# Patient Record
Sex: MALE | Race: WHITE
[De-identification: names, ages, dates, MRNs, and addresses within clinical notes are randomized per-mention and may not be internally consistent; named-entity substitution may affect disease eponyms.]

---

## 2017-07-26 ENCOUNTER — HOSPITAL ENCOUNTER (OUTPATIENT)
Dept: HOSPITAL 47 - EC | Age: 57
Setting detail: OBSERVATION
LOS: 1 days | Discharge: HOME | End: 2017-07-27
Payer: COMMERCIAL

## 2017-07-26 VITALS — BODY MASS INDEX: 33.2 KG/M2

## 2017-07-26 DIAGNOSIS — E78.5: ICD-10-CM

## 2017-07-26 DIAGNOSIS — R42: ICD-10-CM

## 2017-07-26 DIAGNOSIS — I44.7: ICD-10-CM

## 2017-07-26 DIAGNOSIS — Z82.49: ICD-10-CM

## 2017-07-26 DIAGNOSIS — R06.00: ICD-10-CM

## 2017-07-26 DIAGNOSIS — R06.02: ICD-10-CM

## 2017-07-26 DIAGNOSIS — M19.90: ICD-10-CM

## 2017-07-26 DIAGNOSIS — G47.30: ICD-10-CM

## 2017-07-26 DIAGNOSIS — Z79.899: ICD-10-CM

## 2017-07-26 DIAGNOSIS — R07.89: Primary | ICD-10-CM

## 2017-07-26 DIAGNOSIS — K21.9: ICD-10-CM

## 2017-07-26 DIAGNOSIS — Z87.891: ICD-10-CM

## 2017-07-26 DIAGNOSIS — Z90.49: ICD-10-CM

## 2017-07-26 LAB
ALP SERPL-CCNC: 102 U/L (ref 38–126)
ALT SERPL-CCNC: 54 U/L (ref 21–72)
ANION GAP SERPL CALC-SCNC: 13 MMOL/L
APTT BLD: 24.4 SEC (ref 22–30)
AST SERPL-CCNC: 37 U/L (ref 17–59)
BASOPHILS # BLD AUTO: 0.1 K/UL (ref 0–0.2)
BASOPHILS NFR BLD AUTO: 1 %
BUN SERPL-SCNC: 16 MG/DL (ref 9–20)
CALCIUM SPEC-MCNC: 9.5 MG/DL (ref 8.4–10.2)
CH: 31.8
CHCM: 37.5
CHLORIDE SERPL-SCNC: 107 MMOL/L (ref 98–107)
CK SERPL-CCNC: 122 U/L (ref 55–170)
CK SERPL-CCNC: 124 U/L (ref 55–170)
CK SERPL-CCNC: 183 U/L (ref 55–170)
CO2 SERPL-SCNC: 23 MMOL/L (ref 22–30)
EOSINOPHIL # BLD AUTO: 0.2 K/UL (ref 0–0.7)
EOSINOPHIL NFR BLD AUTO: 3 %
ERYTHROCYTE [DISTWIDTH] IN BLOOD BY AUTOMATED COUNT: 5.15 M/UL (ref 4.3–5.9)
ERYTHROCYTE [DISTWIDTH] IN BLOOD: 14.6 % (ref 11.5–15.5)
GLUCOSE SERPL-MCNC: 113 MG/DL (ref 74–99)
HCT VFR BLD AUTO: 43.8 % (ref 39–53)
HDW: 3.07
HGB BLD-MCNC: 15.9 GM/DL (ref 13–17.5)
INR PPP: 1 (ref ?–1.2)
LUC NFR BLD AUTO: 3 %
LYMPHOCYTES # SPEC AUTO: 2.1 K/UL (ref 1–4.8)
LYMPHOCYTES NFR SPEC AUTO: 29 %
MAGNESIUM SPEC-SCNC: 2.1 MG/DL (ref 1.6–2.3)
MCH RBC QN AUTO: 30.9 PG (ref 25–35)
MCHC RBC AUTO-ENTMCNC: 36.3 G/DL (ref 31–37)
MCV RBC AUTO: 85.1 FL (ref 80–100)
MONOCYTES # BLD AUTO: 0.4 K/UL (ref 0–1)
MONOCYTES NFR BLD AUTO: 6 %
NEUTROPHILS # BLD AUTO: 4.2 K/UL (ref 1.3–7.7)
NEUTROPHILS NFR BLD AUTO: 59 %
NON-AFRICAN AMERICAN GFR(MDRD): >60
PH BLDV: 7.59 [PH] (ref 7.31–7.41)
POTASSIUM SERPL-SCNC: 4.1 MMOL/L (ref 3.5–5.1)
PROT SERPL-MCNC: 7.4 G/DL (ref 6.3–8.2)
PT BLD: 10.1 SEC (ref 9–12)
SODIUM SERPL-SCNC: 143 MMOL/L (ref 137–145)
TROPONIN I SERPL-MCNC: 0.05 NG/ML (ref 0–0.03)
TROPONIN I SERPL-MCNC: <0.012 NG/ML (ref 0–0.03)
TROPONIN I SERPL-MCNC: <0.012 NG/ML (ref 0–0.03)
WBC # BLD AUTO: 0.19 10*3/UL
WBC # BLD AUTO: 7.1 K/UL (ref 3.8–10.6)
WBC (PEROX): 6.82

## 2017-07-26 PROCEDURE — 85610 PROTHROMBIN TIME: CPT

## 2017-07-26 PROCEDURE — 82553 CREATINE MB FRACTION: CPT

## 2017-07-26 PROCEDURE — 83690 ASSAY OF LIPASE: CPT

## 2017-07-26 PROCEDURE — 96376 TX/PRO/DX INJ SAME DRUG ADON: CPT

## 2017-07-26 PROCEDURE — 36415 COLL VENOUS BLD VENIPUNCTURE: CPT

## 2017-07-26 PROCEDURE — 82803 BLOOD GASES ANY COMBINATION: CPT

## 2017-07-26 PROCEDURE — 96366 THER/PROPH/DIAG IV INF ADDON: CPT

## 2017-07-26 PROCEDURE — 99285 EMERGENCY DEPT VISIT HI MDM: CPT

## 2017-07-26 PROCEDURE — 96368 THER/DIAG CONCURRENT INF: CPT

## 2017-07-26 PROCEDURE — 85025 COMPLETE CBC W/AUTO DIFF WBC: CPT

## 2017-07-26 PROCEDURE — 83735 ASSAY OF MAGNESIUM: CPT

## 2017-07-26 PROCEDURE — 85379 FIBRIN DEGRADATION QUANT: CPT

## 2017-07-26 PROCEDURE — 96365 THER/PROPH/DIAG IV INF INIT: CPT

## 2017-07-26 PROCEDURE — 82550 ASSAY OF CK (CPK): CPT

## 2017-07-26 PROCEDURE — 93350 STRESS TTE ONLY: CPT

## 2017-07-26 PROCEDURE — 84484 ASSAY OF TROPONIN QUANT: CPT

## 2017-07-26 PROCEDURE — 78452 HT MUSCLE IMAGE SPECT MULT: CPT

## 2017-07-26 PROCEDURE — 93306 TTE W/DOPPLER COMPLETE: CPT

## 2017-07-26 PROCEDURE — 85730 THROMBOPLASTIN TIME PARTIAL: CPT

## 2017-07-26 PROCEDURE — 80053 COMPREHEN METABOLIC PANEL: CPT

## 2017-07-26 PROCEDURE — 93005 ELECTROCARDIOGRAM TRACING: CPT

## 2017-07-26 PROCEDURE — 93017 CV STRESS TEST TRACING ONLY: CPT

## 2017-07-26 PROCEDURE — 71020: CPT

## 2017-07-26 PROCEDURE — 80061 LIPID PANEL: CPT

## 2017-07-26 PROCEDURE — 74245: CPT

## 2017-07-26 PROCEDURE — 96375 TX/PRO/DX INJ NEW DRUG ADDON: CPT

## 2017-07-26 PROCEDURE — 83880 ASSAY OF NATRIURETIC PEPTIDE: CPT

## 2017-07-26 RX ADMIN — SUCRALFATE SCH: 1 TABLET ORAL at 19:19

## 2017-07-26 RX ADMIN — NITROGLYCERIN SCH INCH: 20 OINTMENT TOPICAL at 19:48

## 2017-07-26 RX ADMIN — SUCRALFATE SCH GM: 1 TABLET ORAL at 19:48

## 2017-07-26 RX ADMIN — POTASSIUM CHLORIDE SCH MLS/HR: 14.9 INJECTION, SOLUTION INTRAVENOUS at 19:48

## 2017-07-26 NOTE — P.HPIM
History of Present Illness


H&P Date: 17


Chief Complaint: Chest pain


This will serve both an H&P and discharge summary


This is a 56-year-old  male one of Dr. Carlson who is healthy with 

no prior medical history except for GERD who was seen in emergency room 

secondary to chest pressure and shortness of breath, patient was recognized as 

chest heaviness, nonradiating, started 30 minutes prior to arrival, patient was 

driving to work at that time, he describes as chest pressure, feeling of 

symptoms of bubble sitting in the lower sternal border, patient denies any 

history off chest pain on exertion, patient denies any exercise intolerance, 

patient takes 2-3 tablets up to 6 tablets per week off Aleve.  patient denies 

any nausea vomiting patient has some lightheadedness, risk factors include 

previous tobacco exposure, and age and family history of CAD on a sister and 

father in his 50s, hyperlipidemia.  He underwent a heart catheterization in 

 by Dr. Jack. Was found to have spastic arteries without any evidence of 

plaque formation, patient does not require any nitroglycerin outpatient patient 

currently is undergoing further imaging to include a stress test with 

consultation with cardiology during this admission.  Patient stayed on heparin 

drip and nitro drip, 3 sublingual nitroglycerin given in the ER did not provide 

complete relief, nitro drip subsequently has improved some of the symptoms 

however chest pressure still remains





 





Review of Systems


Constitutional: Reports as per HPI, Denies anorexia, Denies chills, Denies 

chronic headaches, Denies chronic pain, Denies daytime sleepiness, Denies 

fatigue, Denies fever, Denies lethargy, Denies malaise, Denies night sweats, 

Denies poor appetite, Denies sweats, Denies weakness, Denies weight gain, 

Denies weight loss


Ears, nose, mouth and throat: Reports as per HPI, Denies ant. neck pain, Denies 

bleeding gums, Denies dental pain, Denies dysphagia, Denies epistaxis, Denies 

headache, Denies hoarseness, Denies mouth pain, Denies nasal congestion, Denies 

nasal discharge, Denies neck fullness/pressure, Denies neck lump, Denies nose 

pain, Denies odynophagia, Denies post-nasal drip, Denies sinus pain, Denies 

sinus pressure, Denies swelling in mouth, Denies swelling in throat, Denies 

sore throat, Denies vertigo, Denies voice changes


Cardiovascular: Reports as per HPI, Reports chest pain, Denies claudication, 

Denies decreased exercise tolerance, Denies dyspnea on exertion, Denies edema, 

Denies high blood pressure, Denies irregular heart beat, Denies leg edema, 

Denies lightheadedness, Denies orthopnea, Denies palpitations, Denies 

paroxysmal nocturnal dyspnea, Denies phlebitis, Denies rapid heart beat, Denies 

shortness of breath, Denies syncope


Respiratory: Reports as per HPI, Denies congestion, Denies cough, Denies cough 

with sputum, Denies dyspnea, Denies excessive sputum, Denies hemoptysis, Denies 

home oxygen, Denies pain, Denies pain on inspiration, Denies pleurisy, Denies 

respiratory infections, Denies sleep apnea, Denies snoring, Denies wheezing


Gastrointestinal: Reports as per HPI, Denies abdominal pain, Denies belching, 

Denies bloating, Denies BRBPR, Denies change in bowel habits, Denies coffee 

ground emesis, Denies constipation, Denies diarrhea, Denies dyspepsia, Denies 

early satiety, Denies excessive gas, Denies heartburn, Denies hematemesis, 

Denies hematochezia, Denies indigestion, Denies jaundice, Denies lactose 

intolerance, Denies loss of appetite, Denies melena, Denies nausea, Denies 

vomiting


Genitourinary: Reports as per HPI, Denies decreased libido, Denies difficulties 

fathering child, Denies discharge, Denies dysuria, Denies erectile dysfunction, 

Denies flank pain, Denies genital pain, Denies genital sores, Denies hematuria, 

Denies impotence, Denies incontinence, Denies kidney stones, Denies nocturia, 

Denies polyuria, Denies testicular lump, Denies testicular pain, Denies urinary 

frequency, Denies urinary hesitancy, Denies urinary retention


Musculoskeletal: Reports as per HPI, Denies arm numbness/tingling, Denies 

atrophy, Denies fractures, Denies frequent falls, Denies gait dysfunction, 

Denies hot joints, Denies leg numbness/tingling, Denies limitation of motion, 

Denies loss of height, Denies low back pain, Denies morning stiffness, Denies 

muscle cramps, Denies muscle weakness, Denies myalgias, Denies neck pain, 

Denies neck stiffness, Denies prior amputations, Denies redness of joints, 

Denies shooting arm pain, Denies shooting leg pain


Integumentary: Reports as per HPI, Denies acne, Denies boils, Denies brittle 

nails, Denies change in hair/nails, Denies color changes, Denies darkening of 

skin, Denies depigmentation, Denies dryness, Denies foot/leg ulcers, Denies 

growths, Denies hirsutism, Denies lesions, Denies onychomycosis, Denies pruritus

, Denies rash, Denies sores, Denies striae, Denies unusual bruising, Denies 

wounds


Neurological: Reports as per HPI, Denies aphasia, Denies ataxia, Denies balance 

difficulties, Denies burning pain, Denies change in mentation, Denies change in 

smell/taste, Denies change in speech, Denies confusion, Denies convulsions, 

Denies double vision, Denies gait dysfunction, Denies head injury, Denies 

headaches, Denies hearing difficulties, Denies lack of coordination, Denies 

loss of vision, Denies memory loss, Denies migraines, Denies motor disturbance, 

Denies numbness, Denies paralysis, Denies paresthesias, Denies seizures, Denies 

sensory deficit, Denies spasticity, Denies syncope, Denies tic, Denies tingling

, Denies transient paralysis, Denies tremors, Denies vertigo, Denies weakness, 

Denies visual changes


Psychiatric: Reports as per HPI, Denies anhedonia, Denies anxiety, Denies 

anxiety attacks, Denies change in appetite, Denies change in libido, Denies 

change in sleep habits, Denies confusion, Denies depression, Denies difficulty 

concentrating, Denies disorientation, Denies hallucinations, Denies hopelessness

, Denies hypersomnia, Denies insomnia, Denies irritability, Denies memory loss, 

Denies mood swings, Denies paranoia, Denies sadness/tearfulness, Denies sleep 

disturbances, Denies suicidal ideation


Endocrine: Reports as per HPI, Denies cold intolerance, Denies deepening of the 

voice, Denies excessive sweating, Denies excessive thirst, Denies fatigue, 

Denies flushing, Denies heat intolerance, Denies high blood sugars, Denies 

increase in ring/shoe/hat size, Denies low blood sugars, Denies nocturia, 

Denies palpitations, Denies polydipsia, Denies polyphagia, Denies polyuria, 

Denies proptosis, Denies recent glucocorticoid use, Denies thyroid mass, Denies 

weight change


Hematologic/Lymphatic: Reports as per HPI, Denies easy bleeding, Denies easy 

bruising, Denies lymphadenopathy, Denies lymphedema, Denies thrombophilia





Past Medical History


Past Medical History: GERD/Reflux, Osteoarthritis (OA)


Additional Past Medical History / Comment(s): "spasms in arteries and small 

veins"


History of Any Multi-Drug Resistant Organisms: None Reported


Past Surgical History: Appendectomy, Cholecystectomy, Orthopedic Surgery


Additional Past Surgical History / Comment(s): R hip replacement, L shoulder 

surgery, bilateral lower extremities venous laser, negative cath .  

Colonoscopy Dr. Mayes 3 years ago, EGD Dr. Blackman in 2016


Past Anesthesia/Blood Transfusion Reactions: No Reported Reaction


Past Psychological History: No Psychological Hx Reported


Smoking Status: Former smoker


Past Alcohol Use History: None Reported


Past Drug Use History: None Reported





- Past Family History


  ** Mother


Family Medical History: Cancer (Mother  at age 95 from breast cancer she 

also had diabetes  and ovarian cancer), Diabetes Mellitus, Neurologic Disorder (

Dementia)





  ** Father


Family Medical History: Cancer (Father  at the age of 77 from non-Hodgkin 

lymphoma.)





  ** Sister(s)


Family Medical History: Coronary Artery Disease (CAD) (Patient had 4 sisters 

one of them  from CAD.)





  ** Son(s)


Family Medical History: No Reported History (Patient has a son with no major 

problems as well as one stepson)





  ** Daughter(s)


Family Medical History: No Reported History (Patient has one stepdaughter.)





Medications and Allergies


 Home Medications











 Medication  Instructions  Recorded  Confirmed  Type


 


Omeprazole [PriLOSEC] 40 mg PO BID 16 History


 


Albuterol Inhaler [Ventolin Hfa 2 puff INHALATION RT-Q4H PRN 17 

History





Inhaler]    


 


Diclofenac Sodium Gel [Voltaren 1 applic TOPICAL DAILY PRN 17 

History





Gel]    


 


Naproxen Sodium [Aleve] 440 mg PO Q12HR PRN 17 History


 


Plant Stanol Estefani [Cholest Off] 450 mg PO HS 17 History











 Allergies











Allergy/AdvReac Type Severity Reaction Status Date / Time


 


No Known Drug Allergies Allergy  Unknown Verified 17 07:25














Physical Exam


Vitals: 


 Vital Signs











  Temp Pulse Pulse Resp BP BP Pulse Ox


 


 17 12:58    54 L  18   116/67  99


 


 17 09:15  98.0 F   57 L  18   145/78  99


 


 17 08:57  98.2 F  58 L   18  121/73   96


 


 17 08:24   62   18  120/71   97


 


 17 08:14   64   18  110/64   97


 


 17 08:04   60   18  102/58   97


 


 17 07:49   56 L   18  124/74   97


 


 17 07:03   56 L   20  123/68   98


 


 17 06:57  97.6 F  61   22  124/72   99


 


 17 06:37   60   26 H  128/60   100


 


 17 06:33   55 L   20  131/73   99


 


 17 06:09   56 L   22  114/66   99


 


 17 05:50   62   22  143/83   100


 


 17 05:30  97.8 F  74   36 H  166/90   100








 Intake and Output











 17





 22:59 06:59 14:59


 


Intake Total   75


 


Balance   75


 


Intake:   


 


  IV   75


 


    Dextrose 5%-0.45% NaCl 1,   75





    000 ml @ 75 mls/hr IV .   





    [id number] [facility] Rx#:[id number]   


 


Other:   


 


  Weight  111.13 kg 














- Constitutional


General appearance: cooperative, no acute distress





- EENT


Eyes: anicteric sclerae, EOMI, PERRLA, dentition normal


ENT: NA/AT, normal oropharynx





- Neck


Neck: no lymphadenopathy, normal ROM, no other, no rigidity, no stridor, no 

thyromegaly





- Respiratory


Respiratory: bilateral: CTA, negative: diminished, dullness, rales, rhonchi





- Cardiovascular


Rhythm: regular


Heart sounds: normal: S1, S2


Abnormal Heart Sounds: no systolic murmur, no diastolic murmur, no rub, no S3 

Gallop, no S4 Gallop, no click, no other





- Gastrointestinal


General gastrointestinal: normal bowel sounds, soft





- Integumentary


Integumentary: normal, normal turgor





- Neurologic


Neurologic: CNII-XII intact





- Musculoskeletal


Musculoskeletal: no gait normal, no generalized weakness, no strength equal 

bilaterally, no right sided weakness, no left sided weakness





- Psychiatric


Psychiatric: A&O x's 3, appropriate affect, intact judgment & insight





Results


CBC & Chem 7: 


 17 05:41





 17 05:41


Labs: 


 Abnormal Lab Results - Last 24 Hours (Table)











  17 Range/Units





  05:41 05:41 05:41 


 


VBG pH    7.59 H  (7.31-7.41)  


 


VBG pCO2    19 L  (37-51)  mmHg


 


VBG HCO3    19 L  (24-28)  mmol/L


 


Glucose   113 H   (74-99)  mg/dL


 


Total Creatine Kinase  183 H    ()  U/L














Thrombosis Risk Factor Assmnt





- DVT/VTE Prophylaxis


DVT/VTE Prophylaxis: Low risk, early ambulation encouraged





- Choose All That Apply


Each Factor Represents 1 point: Age 41-60 years


Thrombosis Risk Factor Assessment Total Risk Factor Score: 1


Thrombosis Risk Factor Assessment Level: Low Risk





Assessment and Plan


Plan: 





1.  Atypical chest pressure with underlying history off vasospastic coronaries, 

who underwent previously a heart cath by Dr. Pearson in  with no obstructive 

disease, patient currently is being investigated with a nuclear stress test 

with consultation to cardiology, cardiac troponins are negative 3, normal d-

dimer 0.4





2.  GERD.  Continue patient on Prilosec 20 mg orally once every day, Carafate 1 

g every  before meals at bedtime, we will discontinue NSAIDs





3.  DVT prophylaxis.  Early ambulation with bilateral knee-high JORGE hose.





4.  GI prophylaxis.  On PPI





5.  Prior history of cholecystectomy secondary to cholecystitis no prior 

history of kidney stones





6.  Prior history off EGD approximately last  along with colonoscopy that 

shows minimal gastritis without any evidence of any peptic ulcer disease





7.  Probable hyperlipidemia patient's on plant stanol





  Discharge condition stable

## 2017-07-26 NOTE — P.CRDCN
History of Present Illness


Consult date: 17


Requesting physician: Anusha Eaton


Consult reason: chest pain


Chief complaint: Chest pressure and shortness of breath


History of present illness: 


This is a 56-year-old  gentleman with history of GERD, mild 

hyperlipidemia, sleep apnea, he does not use his CPAP.  family history of 

premature coronary artery disease who presents to the hospital with symptoms of 

chest pressure with associated shortness of breath.  He states that he feels 

heavy in the chest and is unable to take a deep breath.  Patient did undergo 

cardiac catheterization in  by Dr. Pearson and was not found to have any 

significant obstructive coronary artery disease but was told at that time to 

have a suspected spasm in the arteries.  Patient used to smoke heavily, and 

drank heavily, he quit that in .  Enzymes 2 have been negative.  EKG shows 

normal sinus rhythm with no changes.  Patient will be scheduled to undergo 

dobutamine echocardiographic study tomorrow further recommendations will be 

based on these findings and patient's clinical course.








Past Medical History


Past Medical History: GERD/Reflux, Osteoarthritis (OA)


Additional Past Medical History / Comment(s): "spasms in arteries and small 

veins"


History of Any Multi-Drug Resistant Organisms: None Reported


Past Surgical History: Appendectomy, Cholecystectomy, Orthopedic Surgery


Additional Past Surgical History / Comment(s): R hip replacement, L shoulder 

surgery, bilateral lower extremities venous laser, negative cath .  

Colonoscopy Dr. Mayes 3 years ago, EGD Dr. Blackman in 2016


Past Anesthesia/Blood Transfusion Reactions: No Reported Reaction


Past Psychological History: No Psychological Hx Reported


Smoking Status: Former smoker


Past Alcohol Use History: None Reported


Past Drug Use History: None Reported





- Past Family History


  ** Mother


Family Medical History: Cancer (Mother  at age 95 from breast cancer she 

also had diabetes  and ovarian cancer), Diabetes Mellitus, Neurologic Disorder (

Dementia)





  ** Father


Family Medical History: Cancer (Father  at the age of 77 from non-Hodgkin 

lymphoma.)





  ** Sister(s)


Family Medical History: Coronary Artery Disease (CAD) (Patient had 4 sisters 

one of them  from CAD.)





  ** Son(s)


Family Medical History: No Reported History (Patient has a son with no major 

problems as well as one stepson)





  ** Daughter(s)


Family Medical History: No Reported History (Patient has one stepdaughter.)





Medications and Allergies


 Home Medications











 Medication  Instructions  Recorded  Confirmed  Type


 


Omeprazole [PriLOSEC] 40 mg PO BID 16 History


 


Albuterol Inhaler [Ventolin Hfa 2 puff INHALATION RT-Q4H PRN 17 

History





Inhaler]    


 


Diclofenac Sodium Gel [Voltaren 1 applic TOPICAL DAILY PRN 17 

History





Gel]    


 


Naproxen Sodium [Aleve] 440 mg PO Q12HR PRN 17 History


 


Plant Stanol Estefani [Cholest Off] 450 mg PO HS 17 History











 Allergies











Allergy/AdvReac Type Severity Reaction Status Date / Time


 


No Known Drug Allergies Allergy  Unknown Verified 17 07:25














Physical Exam


Vitals: 


 Vital Signs











  Temp Pulse Pulse Resp BP BP Pulse Ox


 


 17 12:58    54 L  18   116/67  99


 


 17 09:15  98.0 F   57 L  18   145/78  99


 


 17 08:57  98.2 F  58 L   18  121/73   96


 


 17 08:24   62   18  120/71   97


 


 17 08:14   64   18  110/64   97


 


 17 08:04   60   18  102/58   97


 


 17 07:49   56 L   18  124/74   97


 


 17 07:03   56 L   20  123/68   98


 


 17 06:57  97.6 F  61   22  124/72   99


 


 17 06:37   60   26 H  128/60   100


 


 17 06:33   55 L   20  131/73   99


 


 17 06:09   56 L   22  114/66   99


 


 17 05:50   62   22  143/83   100


 


 17 05:30  97.8 F  74   36 H  166/90   100








 Intake and Output











 17





 06:59 14:59 22:59


 


Intake Total  75 


 


Balance  75 


 


Intake:   


 


  IV  75 


 


    Dextrose 5%-0.45% NaCl 1,  75 





    000 ml @ 75 mls/hr IV .   





    [id number] [facility] Rx#:[id number]   


 


Other:   


 


  Weight 111.13 kg  














PHYSICAL EXAMINATION: 





HEENT: Head is atraumatic, normocephalic.  Pupils equal, round.  Neck is 

supple.  There is no elevated jugular venous pressure.





HEART EXAMINATION: Heart S1, S2 normal.  No murmur or gallop heard.





CHEST EXAMINATION: Lungs are clear to auscultation and precussion. No chest 

wall tenderness is noted on palpation or with deep breathing.





ABDOMEN:  Soft, nontender. Bowel sounds are heard. No organomegaly noted].


 


EXTREMITIES:[ 2+ peripheral pulses with no evidence of peripheral edema and no 

calf tenderness noted].





NEUROLOGIC [patient is awake, alert and oriented -3.]


 


.


 








Results





 17 05:41





 17 05:41


 Cardiac Enzymes











  17 Range/Units





  05:41 05:41 12:41 


 


AST   37   (17-59)  U/L


 


CK-MB (CK-2)  0.8   0.6  (0.0-2.4)  ng/mL


 


Troponin I  <0.012   <0.012  (0.000-0.034)  ng/mL








 Coagulation











  17 Range/Units





  05:41 12:41 


 


PT  10.1   (9.0-12.0)  sec


 


APTT  24.4  29.1  (22.0-30.0)  sec








 CBC











  17 Range/Units





  05:41 


 


WBC  7.1  (3.8-10.6)  k/uL


 


RBC  5.15  (4.30-5.90)  m/uL


 


Hgb  15.9  (13.0-17.5)  gm/dL


 


Hct  43.8  (39.0-53.0)  %


 


Plt Count  214  (150-450)  k/uL








 Comprehensive Metabolic Panel











  17 Range/Units





  05:41 


 


Sodium  143  (137-145)  mmol/L


 


Potassium  4.1  (3.5-5.1)  mmol/L


 


Chloride  107  ()  mmol/L


 


Carbon Dioxide  23  (22-30)  mmol/L


 


BUN  16  (9-20)  mg/dL


 


Creatinine  0.77  (0.66-1.25)  mg/dL


 


Glucose  113 H  (74-99)  mg/dL


 


Calcium  9.5  (8.4-10.2)  mg/dL


 


AST  37  (17-59)  U/L


 


ALT  54  (21-72)  U/L


 


Alkaline Phosphatase  102  ()  U/L


 


Total Protein  7.4  (6.3-8.2)  g/dL


 


Albumin  4.5  (3.5-5.0)  g/dL








 Current Medications











Generic Name Dose Route Start Last Admin





  Trade Name Chavezq  PRN Reason Stop Dose Admin


 


Aminophylline  100 mg  17 15:34  





  Aminophylline  IV  17 23:59  





  ONCE PRN   





  Patient Response   


 


Dextrose/Sodium Chloride  1,000 mls @ 75 mls/hr  17 07:00  





  Dextrose 5%-1/2ns Iv Soln  IV   





  [id number] [facility]   


 


Morphine Sulfate  4 mg  17 06:48  





  Morphine Sulfate (Inj)  IV   





  Q4HR PRN   





  Severe Pain   


 


Naloxone HCl  0.2 mg  17 06:48  





  Narcan  IV   





  Q2M PRN   





  Opioid Reversal   


 


Ondansetron HCl  4 mg  17 06:48  





  Zofran  IVP   





  Q8HR PRN   





  Nausea And Vomiting   


 


Pantoprazole Sodium  40 mg  17 07:30  





  Protonix  PO   





  AC-BRKFST ANGELA   


 


Sucralfate  1 gm  17 17:30  





  Carafate  PO   





  ACHS ANGELA   








 Intake and Output











 17





 06:59 14:59 22:59


 


Intake Total  75 


 


Balance  75 


 


Intake:   


 


  IV  75 


 


    Dextrose 5%-0.45% NaCl 1,  75 





    000 ml @ 75 mls/hr IV .   





    [id number] [facility] Rx#:[id number]   


 


Other:   


 


  Weight 111.13 kg  








 





 17 05:41 





 17 05:41 











EKG Interpretations (text)


EKG shows normal sinus rhythm with incomplete left bundle branch block pattern, 

no acute changes noted.








Assessment and Plan


Plan: 


Assessment and plan


#1 chest pain, atypical for acute coronary syndrome.  Troponins negative 2.  

Cardiac catheterization performed in  revealed normal coronary arteries was 

suspicion of spasm


#2 history of smoking


#3 mild hyperlipidemia


#4 family history of premature coronary artery disease


# 5 GERD


#6 sleep apnea








Plan


We'll obtain an echocardiogram with Doppler study.  Patient has been 

recommended undergo dobutamine echocardiographic study, further recommendations 

will be based on these findings and the patient's clinical course.








DNP note has been reviewed, I agree with a documented findings and plan of 

care.  Patient was seen and examined.

## 2017-07-26 NOTE — ECHOF
Referral Reason:Chest pain



MEASUREMENTS

--------

HEIGHT: 182.9 cm

WEIGHT: 111.1 kg

BP: 124/72

RVIDd:   3.2 cm     (< 3.3)

IVSd:   1.1 cm     (0.6 - 1.1)

LVIDd:   5.2 cm     (3.9 - 5.3)

LVPWd:   1.6 cm     (0.6 - 1.1)

IVSs:   1.5 cm

LVIDs:   3.9 cm

LVPWs:   1.6 cm

LA Diam:   3.6 cm     (2.7 - 3.8)

LAESV Index (A-L):   23.30 ml/m

Ao Diam:   3.0 cm     (2.0 - 3.7)

AV Cusp:   2.4 cm     (1.5 - 2.6)

LA Diam:   4.1 cm     (2.7 - 3.8)

MV EXCURSION:   23.601 mm     (> 18.000)

MV EF SLOPE:   94 mm/s     (70 - 150)

EPSS:   0.5 cm

MV E Duane:   0.38 m/s

MV DecT:   240 ms

MV A Duane:   0.48 m/s

MV E/A Ratio:   0.80 

RAP:   5.00 mmHg

RVSP:   14.00 mmHg







FINDINGS

--------

Sinus rhythm.

This was a technically adequate study.

Left ventricular wall thickness is normal.   Overall 

left ventricular systolic function is normal with, an 

EF between 55 - 60 %.

The right ventricle is normal in size.

Normal LA  size by volume 22+/-6 ml/m2.

The right atrial size is normal.

There is mild aortic valve sclerosis.   There is no 

evidence of aortic regurgitation.

Mild mitral annular calcification present.   Mild 

mitral regurgitation is present.

Mild tricuspid regurgitation present.   There is no 

evidence of pulmonary hypertension.   The right 

ventricular systolic pressure, as measured by Doppler, 

is 14.00mmHg.

There is no pulmonic regurgitation present.

The aortic root size is normal.

There is no pericardial effusion.



CONCLUSIONS

--------

1. Left ventricular wall thickness is normal.

2. There is no pericardial effusion.

3. Overall left ventricular systolic function is normal 

with, an EF between 55 - 60 %.

4. There is mild aortic valve sclerosis.

5. Mild mitral regurgitation is present.

6. Mild tricuspid regurgitation present.

7. There is no evidence of pulmonary hypertension.

8. The right ventricular systolic pressure, as measured by 

Doppler, is 14.00mmHg.

9. There is no pulmonic regurgitation present.

10. The aortic root size is normal.





SONOGRAPHER: Cara Granado RDCS

## 2017-07-26 NOTE — XR
EXAM:

  XR Chest, 2 Views

 

CLINICAL HISTORY:

  Reason: Chest Pain

 

TECHNIQUE:

  Frontal and lateral views of the chest.

 

COMPARISON:

  Chest x-ray 12/22/16

 

FINDINGS:

  Lungs:  Hypoventilatory lungs.  Mild Bibasilar lung atelectasis.

  Pleural space:  Unremarkable.  No pneumothorax.

  Heart:  Unremarkable.  No cardiomegaly.

  Mediastinum:  Unremarkable.

  Bones/joints:  Unremarkable.

 

IMPRESSION:     

  Hypoventilatory lungs.  Mild Bibasilar lung atelectasis.

## 2017-07-26 NOTE — ED
General Adult HPI





- General


Chief complaint: Shortness of Breath


Stated complaint: MARCO A


Time Seen by Provider: 17 05:33


Source: patient, RN notes reviewed


Mode of arrival: wheelchair


Limitations: no limitations





- History of Present Illness


Initial comments: 





56 male presents with chief complaint of dyspnea and chest pressure.  Patient 

describes his chest pain is primarily pressure heaviness.  Some the center of 

his chest.  Nonradiating.  This pain started approximately 30 minutes prior to 

arrival.  He also had shortness of breath.  Denies nausea vomiting, states he 

did feel lightheaded.  Denies abdominal pain.  Patient has no known history of 

coronary artery disease, no history hypertension.  Patient was a former smoker.

  Patient states he had similar episode approximately to 3 years ago, patient 

states he did have a heart cath at that time and as he knows it was no 

intervention.  He believes his symptoms at that time were related to "spasms".  





- Related Data


 Home Medications











 Medication  Instructions  Recorded  Confirmed


 


Omeprazole [PriLOSEC] 40 mg PO DAILY 16











 Allergies











Allergy/AdvReac Type Severity Reaction Status Date / Time


 


No Known Drug Allergies Allergy  Unknown Verified 17 06:06














Review of Systems


ROS Statement: 


Those systems with pertinent positive or pertinent negative responses have been 

documented in the HPI.





ROS Other: All systems not noted in ROS Statement are negative.


Respiratory: Reports: dyspnea


Cardiovascular: Reports: chest pain





Past Medical History


Past Medical History: GERD/Reflux, Osteoarthritis (OA)


Additional Past Medical History / Comment(s): "spasms in arteries and small 

veins"


History of Any Multi-Drug Resistant Organisms: None Reported


Past Surgical History: Appendectomy, Cholecystectomy, Orthopedic Surgery


Additional Past Surgical History / Comment(s): R hip replacement, L shoulder 

surgery, bilateral lower extremities venous laser, negative cath 


Past Anesthesia/Blood Transfusion Reactions: No Reported Reaction


Past Psychological History: No Psychological Hx Reported


Smoking Status: Former smoker


Past Alcohol Use History: None Reported


Past Drug Use History: None Reported





- Past Family History


  ** Mother


Family Medical History: Cancer (Mother  at age 95 from breast cancer she 

also had diabetes [facility] Diabetes Mellitus





  ** Father


Family Medical History: Cancer (Father  at the age of 77 from non-Hodgkin 

lymphoma.)





  ** Sister(s)


Family Medical History: Coronary Artery Disease (CAD) (Patient had 4 sisters 

one of them  from CAD.)





  ** Son(s)


Family Medical History: No Reported History (Patient has a son with no major 

problems as well as one stepson)





  ** Daughter(s)


Family Medical History: No Reported History (Patient has one stepdaughter.)





General Exam


Limitations: no limitations


General appearance: alert, anxious, in distress


Head exam: Present: atraumatic, normocephalic


Eye exam: Present: normal appearance, PERRL


ENT exam: Present: normal exam, mucous membranes moist


Neck exam: Present: normal inspection, full ROM.  Absent: tenderness


Respiratory exam: Present: normal lung sounds bilaterally, respiratory distress

, other (Lungs are clear, good air entry, patient is tachypneic)


Cardiovascular Exam: Present: regular rate, normal rhythm


GI/Abdominal exam: Present: soft.  Absent: distended, tenderness, guarding


Extremities exam: Present: normal inspection, normal capillary refill, other (

Bilateral DP pulses 2+).  Absent: pedal edema


Neurological exam: Present: alert, oriented X3


Psychiatric exam: Present: anxious


Skin exam: Present: warm, dry.  Absent: cyanosis, diaphoretic





Course





 Vital Signs











  17





  05:30 05:50 06:09


 


Temperature 97.8 F  


 


Pulse Rate 74 62 56 L


 


Respiratory 36 H 22 22





Rate   


 


Blood Pressure 166/90 143/83 114/66


 


O2 Sat by Pulse 100 100 99





Oximetry   














  17





  06:33 06:37


 


Temperature  


 


Pulse Rate 55 L 60


 


Respiratory 20 26 H





Rate  


 


Blood Pressure 131/73 128/60


 


O2 Sat by Pulse 99 100





Oximetry  














EKG Findings





- EKG Comments:


EKG Findings:: EKG obtained at 534 with symptoms present shows normal sinus 

rhythm, incomplete left bundle.  There may be some ST segment depression in V5 

and V6.  Ventricular rate is 72, NV interval 206, QRS duration 116, .  

EKG obtained at 547 Patient is asymptomatic sinus rhythm with no ST segment 

elevation or depression.  EKG obtained at 628 unchanged, EKG at 637 unchanged.





Medical Decision Making





- Medical Decision Making





56-year-old male presenting with chest pressure and heaviness associated with 

shortness of breath.  Patient does appear uncomfortable on initial examination.

  Patient is given nitroglycerin and aspirin.  Pain does subside with 

nitroglycerin.  While in the emergency department the patient has waxing and 

waning symptoms.  Initial EKG does show some nonspecific changes concerning for 

ischemia.  Patient is started on heparin and nitroglycerin infusion.








Laboratory studies including CBC, CMP, and cardiac enzymes is unremarkable.  D-

dimer is obtained given the profound dyspnea.  This is negative.  Patient's 

shortness of breath is associated with his chest pain.








The patient states that approximately 3 years ago he had chest pain and 

believes that this was from spasms in his blood vessels, these records are not 

available at the time of my evaluation.





Patient will be admitted for unstable angina.





- Lab Data


Result diagrams: 


 17 05:41





 17 05:41





 Lab Results











  17 Range/Units





  05:41 05:41 05:41 


 


WBC   7.1   (3.8-10.6)  k/uL


 


RBC   5.15   (4.30-5.90)  m/uL


 


Hgb   15.9   (13.0-17.5)  gm/dL


 


Hct   43.8   (39.0-53.0)  %


 


MCV   85.1   (80.0-100.0)  fL


 


MCH   30.9   (25.0-35.0)  pg


 


MCHC   36.3   (31.0-37.0)  g/dL


 


RDW   14.6   (11.5-15.5)  %


 


Plt Count   214   (150-450)  k/uL


 


Neutrophils %   59   %


 


Lymphocytes %   29   %


 


Monocytes %   6   %


 


Eosinophils %   3   %


 


Basophils %   1   %


 


Neutrophils #   4.2   (1.3-7.7)  k/uL


 


Lymphocytes #   2.1   (1.0-4.8)  k/uL


 


Monocytes #   0.4   (0-1.0)  k/uL


 


Eosinophils #   0.2   (0-0.7)  k/uL


 


Basophils #   0.1   (0-0.2)  k/uL


 


Hyperchromasia   Slight   


 


PT     (9.0-12.0)  sec


 


INR     (<1.2)  


 


APTT     (22.0-30.0)  sec


 


D-Dimer     (<0.60)  mg/L FEU


 


VBG pH     (7.31-7.41)  


 


VBG pCO2     (37-51)  mmHg


 


VBG HCO3     (24-28)  mmol/L


 


Sodium    143  (137-145)  mmol/L


 


Potassium    4.1  (3.5-5.1)  mmol/L


 


Chloride    107  ()  mmol/L


 


Carbon Dioxide    23  (22-30)  mmol/L


 


Anion Gap    13  mmol/L


 


BUN    16  (9-20)  mg/dL


 


Creatinine    0.77  (0.66-1.25)  mg/dL


 


Est GFR (MDRD) Af Amer    >60  (>60 ml/min/1.73 sqM)  


 


Est GFR (MDRD) Non-Af    >60  (>60 ml/min/1.73 sqM)  


 


Glucose    113 H  (74-99)  mg/dL


 


Calcium    9.5  (8.4-10.2)  mg/dL


 


Magnesium    2.1  (1.6-2.3)  mg/dL


 


Total Bilirubin    0.8  (0.2-1.3)  mg/dL


 


AST    37  (17-59)  U/L


 


ALT    54  (21-72)  U/L


 


Alkaline Phosphatase    102  ()  U/L


 


Total Creatine Kinase  183 H    ()  U/L


 


CK-MB (CK-2)  0.8    (0.0-2.4)  ng/mL


 


CK-MB (CK-2) Rel Index  0.4    


 


Troponin I  <0.012    (0.000-0.034)  ng/mL


 


NT-Pro-B Natriuret Pep     pg/mL


 


Total Protein    7.4  (6.3-8.2)  g/dL


 


Albumin    4.5  (3.5-5.0)  g/dL














  17 Range/Units





  05:41 05:41 05:41 


 


WBC     (3.8-10.6)  k/uL


 


RBC     (4.30-5.90)  m/uL


 


Hgb     (13.0-17.5)  gm/dL


 


Hct     (39.0-53.0)  %


 


MCV     (80.0-100.0)  fL


 


MCH     (25.0-35.0)  pg


 


MCHC     (31.0-37.0)  g/dL


 


RDW     (11.5-15.5)  %


 


Plt Count     (150-450)  k/uL


 


Neutrophils %     %


 


Lymphocytes %     %


 


Monocytes %     %


 


Eosinophils %     %


 


Basophils %     %


 


Neutrophils #     (1.3-7.7)  k/uL


 


Lymphocytes #     (1.0-4.8)  k/uL


 


Monocytes #     (0-1.0)  k/uL


 


Eosinophils #     (0-0.7)  k/uL


 


Basophils #     (0-0.2)  k/uL


 


Hyperchromasia     


 


PT  10.1    (9.0-12.0)  sec


 


INR  1.0    (<1.2)  


 


APTT  24.4    (22.0-30.0)  sec


 


D-Dimer  0.40    (<0.60)  mg/L FEU


 


VBG pH    7.59 H  (7.31-7.41)  


 


VBG pCO2    19 L  (37-51)  mmHg


 


VBG HCO3    19 L  (24-28)  mmol/L


 


Sodium     (137-145)  mmol/L


 


Potassium     (3.5-5.1)  mmol/L


 


Chloride     ()  mmol/L


 


Carbon Dioxide     (22-30)  mmol/L


 


Anion Gap     mmol/L


 


BUN     (9-20)  mg/dL


 


Creatinine     (0.66-1.25)  mg/dL


 


Est GFR (MDRD) Af Amer     (>60 ml/min/1.73 sqM)  


 


Est GFR (MDRD) Non-Af     (>60 ml/min/1.73 sqM)  


 


Glucose     (74-99)  mg/dL


 


Calcium     (8.4-10.2)  mg/dL


 


Magnesium     (1.6-2.3)  mg/dL


 


Total Bilirubin     (0.2-1.3)  mg/dL


 


AST     (17-59)  U/L


 


ALT     (21-72)  U/L


 


Alkaline Phosphatase     ()  U/L


 


Total Creatine Kinase     ()  U/L


 


CK-MB (CK-2)     (0.0-2.4)  ng/mL


 


CK-MB (CK-2) Rel Index     


 


Troponin I     (0.000-0.034)  ng/mL


 


NT-Pro-B Natriuret Pep   30   pg/mL


 


Total Protein     (6.3-8.2)  g/dL


 


Albumin     (3.5-5.0)  g/dL














Disposition


Clinical Impression: 


 Unstable angina





Disposition: ADMITTED AS IP TO THIS [facility]


Referrals: 


Mara Carlson MD [Primary Care Provider] - 1-2 days


Decision to Admit Reason: Admit from EC


Decision Date: 17


Decision Time: 07:11

## 2017-07-27 VITALS
SYSTOLIC BLOOD PRESSURE: 134 MMHG | TEMPERATURE: 97.8 F | RESPIRATION RATE: 16 BRPM | HEART RATE: 60 BPM | DIASTOLIC BLOOD PRESSURE: 82 MMHG

## 2017-07-27 LAB
ALP SERPL-CCNC: 74 U/L (ref 38–126)
ALT SERPL-CCNC: 52 U/L (ref 21–72)
ANION GAP SERPL CALC-SCNC: 9 MMOL/L
AST SERPL-CCNC: 36 U/L (ref 17–59)
BASOPHILS # BLD AUTO: 0 K/UL (ref 0–0.2)
BASOPHILS NFR BLD AUTO: 1 %
BUN SERPL-SCNC: 13 MG/DL (ref 9–20)
CALCIUM SPEC-MCNC: 8.5 MG/DL (ref 8.4–10.2)
CH: 31.7
CHCM: 36.7
CHLORIDE SERPL-SCNC: 110 MMOL/L (ref 98–107)
CHOLEST SERPL-MCNC: 151 MG/DL (ref ?–200)
CK SERPL-CCNC: 109 U/L (ref 55–170)
CK SERPL-CCNC: 115 U/L (ref 55–170)
CO2 SERPL-SCNC: 23 MMOL/L (ref 22–30)
EOSINOPHIL # BLD AUTO: 0.2 K/UL (ref 0–0.7)
EOSINOPHIL NFR BLD AUTO: 4 %
ERYTHROCYTE [DISTWIDTH] IN BLOOD BY AUTOMATED COUNT: 4.8 M/UL (ref 4.3–5.9)
ERYTHROCYTE [DISTWIDTH] IN BLOOD: 14.6 % (ref 11.5–15.5)
GLUCOSE SERPL-MCNC: 92 MG/DL (ref 74–99)
HCT VFR BLD AUTO: 41.6 % (ref 39–53)
HDLC SERPL-MCNC: 27 MG/DL (ref 40–60)
HDW: 3
HGB BLD-MCNC: 14.6 GM/DL (ref 13–17.5)
INR PPP: 1 (ref ?–1.2)
LUC NFR BLD AUTO: 3 %
LYMPHOCYTES # SPEC AUTO: 1.5 K/UL (ref 1–4.8)
LYMPHOCYTES NFR SPEC AUTO: 27 %
MCH RBC QN AUTO: 30.5 PG (ref 25–35)
MCHC RBC AUTO-ENTMCNC: 35.2 G/DL (ref 31–37)
MCV RBC AUTO: 86.6 FL (ref 80–100)
MONOCYTES # BLD AUTO: 0.3 K/UL (ref 0–1)
MONOCYTES NFR BLD AUTO: 6 %
NEUTROPHILS # BLD AUTO: 3.2 K/UL (ref 1.3–7.7)
NEUTROPHILS NFR BLD AUTO: 60 %
NON-AFRICAN AMERICAN GFR(MDRD): >60
POTASSIUM SERPL-SCNC: 4.1 MMOL/L (ref 3.5–5.1)
PROT SERPL-MCNC: 6.3 G/DL (ref 6.3–8.2)
PT BLD: 10.5 SEC (ref 9–12)
SODIUM SERPL-SCNC: 142 MMOL/L (ref 137–145)
TROPONIN I SERPL-MCNC: 0.01 NG/ML (ref 0–0.03)
TROPONIN I SERPL-MCNC: <0.012 NG/ML (ref 0–0.03)
WBC # BLD AUTO: 0.14 10*3/UL
WBC # BLD AUTO: 5.4 K/UL (ref 3.8–10.6)
WBC (PEROX): 5.23

## 2017-07-27 RX ADMIN — NITROGLYCERIN SCH: 20 OINTMENT TOPICAL at 01:22

## 2017-07-27 RX ADMIN — POTASSIUM CHLORIDE SCH: 14.9 INJECTION, SOLUTION INTRAVENOUS at 11:11

## 2017-07-27 RX ADMIN — SUCRALFATE SCH: 1 TABLET ORAL at 11:10

## 2017-07-27 RX ADMIN — POTASSIUM CHLORIDE SCH: 14.9 INJECTION, SOLUTION INTRAVENOUS at 01:21

## 2017-07-27 RX ADMIN — NITROGLYCERIN SCH: 20 OINTMENT TOPICAL at 06:09

## 2017-07-27 RX ADMIN — NITROGLYCERIN SCH: 20 OINTMENT TOPICAL at 16:06

## 2017-07-27 RX ADMIN — SUCRALFATE SCH: 1 TABLET ORAL at 16:06

## 2017-07-27 NOTE — EST
Referral Reason:chest heaviness



MEASUREMENTS

--------

HEIGHT: 182.9 cm

WEIGHT: 111.1 kg

BP: 113/68







FINDINGS

--------

The patient received intravenous dobutamine in 5 min 

(low dose 5mcg/kg/min) and 3 minute stages 

(>5mcg/kg/min) to a maximum of 40mcg/kgm plus 0 mg 

atropine.

Max Heart Rate: 101  % of Max Predicted Heart Rate: 62  

Rest Heart Rate: 61  Rest BP: 113/85  Max BP: 168/36  

Mets Achieved: NA

The test was stopped because of shortness of breath.

Sinus rhythm.

At recovery dobutamine stress  there was appropriate 

augmentation of systolic function of all segments with 

decrease in cavity size.



CONCLUSIONS

--------

1. The patient received intravenous dobutamine in 5 min 

(low dose 5mcg/kg/min) and 3 minute stages 

(>5mcg/kg/min) to a maximum of 40mcg/kg/min plus 0 mg 

atropine.

2. The test was stopped because of shortness of breath.

3. Sinus rhythm.

4. At recovery dobutamine stress  there was appropriate 

augmentation of systolic function of all segments with 

decrease in cavity size.

5. Suboptimal study.  Target HR not achieved.





SONOGRAPHER: Toshia Kearns RDCS

[facility]

## 2017-07-27 NOTE — NM
EXAMINATION TYPE: NM stress lexiscan cardiolite

 

DATE OF EXAM: 7/27/2017

 

COMPARISON: Prior nuclear medicine stress Cardiolite exam March 24, 2014

 

HISTORY: Chest pain per order. History of Family history of heart attack, tobacco use quit 3 years ag
o, hypercholesteremia, and prior heart catheterization also presents with difficulty in breathing

 

TECHNIQUE:  After the intravenous administration of 10.58 mCi Tc 99m Sestamibi - Cardiolite resting S
PECT images acquired 45 minutes post injection. 

 

The patient received 0.4mg Lexiscan, 27.3 mCi Tc 99m Sestamibi - Stress images obtained 30 minutes po
st injection 

 

FINDINGS:  

 

Review of stress and rest SPECT images demonstrates no distinct perfusion abnormality.  Gated analysi
s shows normal wall motion with an estimated left ventricular ejection fraction of 62 %.

 

 

 

IMPRESSION:  

 

No scintigraphic evidence for reversible ischemia.

## 2017-07-27 NOTE — EST
Stress Test Results/Findings: 

Exam Performed: Stress Lexiscan

Exam Date: 7/27/17

Reason for Exam: Chest Pain



Height: 6 ft

Weight: 111.13 kg

Protocol: Lexiscan

Stage: 

Duration of Exercise: 



Resting Heart Rate: 56

Resting Blood Pressure: 129/71

Maximum Achieved Heart Rate: 94

Maximum Achieved Blood Pressure: 140/70

85% PMHR: 

100% PMHR: 

METS: 

Technologist Comment: 



Stress Test Results/Findings: 

Patient was given Lexiscan injection over a period of for 15 seconds.  A sting 
EKG shows normal sinus rhythm with normal TX interval and QRS duration and 
normal ST-T waves.  No ST segment depression suggestive of ischemia is noted.  
Results of the nuclear study will follow.

TRACIED

## 2017-07-27 NOTE — FL
EXAMINATION TYPE: FL UGI w esophagus w sm bowel

 

DATE OF EXAM: 7/27/2017

 

CLINICAL HISTORY: Upper abd discomfort, poss achalasia, esophageal s

 

 

TECHNIQUE: An air contrast UGI study is performed with small bowel follow through. Dedicated esophagr
am was also obtained.

 

FINDINGS:   image of the abdomen shows no gross abnormality.

 

The esophagus shows normal motility and emptying into the stomach.  No evidence of hiatal hernia or s
tricture noted. No aspiration or penetration.

 

The stomach shows normal distensibility, peristalsis, and mucosal folds.  No evidence of any mass or 
ulcer disease.  No significant esophageal reflux was seen during real time performance of this study.


 

The duodenal bulb and sweep are unremarkable.

 

The small bowel study shows normal transit to the colon in less than 110 minutes.  There is normal mu
cosal fold pattern throughout the small bowel.  There is no evidence of any stricture or filling defe
ct noted.  The terminal ileum is unremarkable.

 

IMPRESSION:  Normal upper GI study, esophagram and small bowel follow through.

## 2017-07-27 NOTE — ECHOS
Referral Reason:chest heaviness



MEASUREMENTS

--------

HEIGHT: 182.9 cm

WEIGHT: 111.1 kg

BP: 113/68







FINDINGS

--------

The patient received intravenous dobutamine in 5 min 

(low dose 5mcg/kg/min) and 3 minute stages 

(>5mcg/kg/min) to a maximum of 40mcg/kgm plus 0 mg 

atropine.

Max Heart Rate: 101  % of Max Predicted Heart Rate: 62  

Rest Heart Rate: 61  Rest BP: 113/85  Max BP: 168/36  

Mets Achieved: NA

The test was stopped because of shortness of breath.

Sinus rhythm.

At recovery dobutamine stress  there was appropriate 

augmentation of systolic function of all segments with 

decrease in cavity size.



CONCLUSIONS

--------

1. The patient received intravenous dobutamine in 5 min 

(low dose 5mcg/kg/min) and 3 minute stages 

(>5mcg/kg/min) to a maximum of 40mcg/kg/min plus 0 mg 

atropine.

2. The test was stopped because of shortness of breath.

3. Sinus rhythm.

4. At recovery dobutamine stress  there was appropriate 

augmentation of systolic function of all segments with 

decrease in cavity size.

5. Suboptimal study.  Target HR not achieved.





SONOGRAPHER: Toshia Kearns RDCS

## 2017-07-28 NOTE — P.PN
Progress Note - Text


This is a 56-year-old gentleman who was admitted to the hospital with 

complaints of exertional shortness of breath and atypical chest pain.  Patient 

was sent for dobutamine echo but patient developed significant shortness of 

breath and the test was canceled.  Subsequently, patient had a nuclear stress 

test with the Lexiscan.  This showed evidence of normal perfusion and function.

  Echocardiogram also showed normal function without any significant valvular 

abnormalities.  Patient is being discharged home.  Patient's symptoms are 

atypical and unexplained.  He is going to have follow-up with Dr. Holm and 

will have further evaluation.  Patient may need pulmonary function tests and 

sleep apnea study.





Physical examination revealed a middle-aged gentleman was alert, oriented and 

doesn't appear to be in acute distress.  Lungs are clear.  Heart is regular.  

No JVD.  No peripheral edema.  Vital signs are stable.





Final impression: Atypical chest pain and shortness of breath.





Plan: Patient is being discharged home.  Follow-up with Dr. Holm

## 2017-07-31 NOTE — P.STRESS
- Stress Test Note


Stress Test Results/Findings: 


Exam Performed: NM stress lexiscan cardiolite


Exam Date: 07/27/17


Reason for Exam: Chest Pain





Height: 6 ft


Weight: 111.13 kg


Protocol: Lexiscan


Stage: 


Duration of Exercise: 





Resting Heart Rate: 56


Resting Blood Pressure: 129/71


Maximum Achieved Heart Rate: 94


Maximum Achieved Blood Pressure: 140/70


85% PMHR: 


100% PMHR: 


METS: 


Technologist Comment: 





Stress Test Results/Findings: 


Patient was given Lexiscan injection what appeared of 15 seconds.  Resting EKG 

shows normal sinus rhythm with normal NE interval and QRS duration and normal ST

-T wave stool no ST segment depression suggestive of ischemia is noted.  

Results of the nuclear study will follow.

## 2017-08-18 ENCOUNTER — HOSPITAL ENCOUNTER (OUTPATIENT)
Dept: HOSPITAL 47 - RADCTMAIN | Age: 57
End: 2017-08-18
Payer: COMMERCIAL

## 2017-08-18 DIAGNOSIS — R10.11: ICD-10-CM

## 2017-08-18 DIAGNOSIS — N28.1: Primary | ICD-10-CM

## 2017-08-18 PROCEDURE — 74160 CT ABDOMEN W/CONTRAST: CPT

## 2017-08-18 NOTE — CT
EXAMINATION TYPE: CT abdomen w con

 

DATE OF EXAM: 8/18/2017

 

COMPARISON: 12/23/2016

 

INDICATION: Rt sided pain

 

DLP: 1325.7 mGycm, Automated exposure control for dose reduction was used.

 

CONTRAST:  100 mL of Omnipaque 300. 

                        Study performed with Oral Contrast

 

TECHNIQUE: Axial images were obtained from above the diaphragm to the pubic rami in the axial plane a
t 5 mm thick sections.  Reconstructed images are reviewed on the computer in the coronal plane.  

 

FINDINGS:

 

Limited CT sections are obtained the lung bases.  The lung bases are clear.

 

CT ABDOMEN:

 

Liver: Liver has diminished density in relation is clean compatible with mild to moderate fatty infil
tration of the liver. No discrete masses or cysts are evident.

 

Spleen: Normal

 

Pancreas: Normal

 

Adrenal glands: The adrenal glands are normal.

 

Gallbladder: Surgically absent  

 

Kidneys: No masses are evident. No hydronephrosis is present.   There is a 2.0 cm cyst measuring 4 Ho
unsfield units compatible with simple cyst on the mid posterior left kidney. This has enlarged from p
rior study.  Delayed images were obtained through the kidneys, which remain unremarkable.

 

Aorta: Normal

 

Inferior vena cava: Normal.

 

Loops of bowel within the abdomen and pelvis are normal.     There are loops of bowel which are incom
pletely distended or lack oral contrast limiting their evaluation.

 

Appendix: Not visualized. No suspicious tubular structure inflammatory changes are evident.

 

Osseous structures: No suspicious lytic or sclerotic lesions. Sacroiliac joint degenerative changes w
ithin the field-of-view is present.

 

IMPRESSIONS:

1.  2 cm simple appearing cyst posterior left kidney. This has enlarged from comparison.

## 2018-12-21 ENCOUNTER — HOSPITAL ENCOUNTER (OUTPATIENT)
Dept: HOSPITAL 47 - ORWHC2ENDO | Age: 58
Discharge: HOME | End: 2018-12-21
Attending: SURGERY
Payer: COMMERCIAL

## 2018-12-21 VITALS — SYSTOLIC BLOOD PRESSURE: 141 MMHG | HEART RATE: 59 BPM | DIASTOLIC BLOOD PRESSURE: 79 MMHG

## 2018-12-21 VITALS — BODY MASS INDEX: 34.5 KG/M2

## 2018-12-21 VITALS — TEMPERATURE: 98.3 F | RESPIRATION RATE: 18 BRPM

## 2018-12-21 DIAGNOSIS — Z79.899: ICD-10-CM

## 2018-12-21 DIAGNOSIS — K21.9: ICD-10-CM

## 2018-12-21 DIAGNOSIS — I25.2: ICD-10-CM

## 2018-12-21 DIAGNOSIS — Z87.891: ICD-10-CM

## 2018-12-21 DIAGNOSIS — K62.0: Primary | ICD-10-CM

## 2018-12-21 DIAGNOSIS — Z99.89: ICD-10-CM

## 2018-12-21 DIAGNOSIS — I20.1: ICD-10-CM

## 2018-12-21 DIAGNOSIS — M19.90: ICD-10-CM

## 2018-12-21 DIAGNOSIS — G47.30: ICD-10-CM

## 2018-12-21 DIAGNOSIS — G43.909: ICD-10-CM

## 2018-12-21 DIAGNOSIS — Z90.49: ICD-10-CM

## 2018-12-21 DIAGNOSIS — Z96.641: ICD-10-CM

## 2018-12-21 DIAGNOSIS — K64.4: ICD-10-CM

## 2018-12-21 DIAGNOSIS — K64.8: ICD-10-CM

## 2018-12-21 PROCEDURE — 45385 COLONOSCOPY W/LESION REMOVAL: CPT

## 2018-12-21 PROCEDURE — 88305 TISSUE EXAM BY PATHOLOGIST: CPT

## 2018-12-21 NOTE — P.PCN
Date of Procedure: 12/21/18


Procedure(s) Performed: 


Date of Procedure: 12/21/18


Procedure(s) Performed: 


PREOPERATIVE DIAGNOSIS: Rectal bleeding


POSTOPERATIVE DIAGNOSIS: Hypertrophied anal papilla


PROCEDURE: Colonoscopy with snare polypectomy


ANESTHESIA: MAC


SURGEON: Krathik Blackman M.D.


SPECIMENS: Anal papilla


ENDOSCOPIC PROCEDURE:  The patient was placed on the endoscopy table in the 

left decubitus position.  The Olympus colonoscope was inserted into the anus 

and passed under direct visualization to the base of the cecum.  The 

appendiceal orifice was visualized.  From that point the scope was slowly 

withdrawn inspecting all surfaces carefully.  There were no neoplastic 

inflammatory or polypoid lesions throughout the cecum, ascending, transverse, 

descending, sigmoid and rectum.  Retroflexion at the anus/distal rectum 

revealed a hypertrophied anal papilla.  This was removed using the snare with 

cautery technique.  There was no visible diverticulosis.  There were small 

internal and external hemorrhoids in the right posterior position and anterior 

position that appeared free of any recent bleeding.  The anal papilla itself 

however was indurated and was likely the source of recent bleeding.  The patient

's excessive mucousy drainage may have been on the basis of the papilla.  No 

fistula was identified.  Digital rectal examination was otherwise normal.  The 

patient was taken to the recovery room in stable condition per anesthesia 

guidelines.


RECOMMENDATIONS: Await biopsy results.  Monitor patient's symptoms.

## 2018-12-21 NOTE — P.GSHP
History of Present Illness


H&P Date: 18


Chief Complaint: Rectal bleeding





Patient here today for colonoscopy.  Patient has had frequent bloody and 

mucousy drainage rectally.  Some bad foul odor at times.  No fevers.  No pain.





Past Medical History


Past Medical History: Chest Pain / Angina, GERD/Reflux, Myocardial Infarction (

MI), Osteoarthritis (OA), Sleep Apnea/CPAP/BIPAP


Additional Past Medical History / Comment(s): "spasms in arteries and small 

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 

bleeding, bursitits,


Last Myocardial Infarction Date:: unknown


History of Any Multi-Drug Resistant Organisms: None Reported


Past Surgical History: Appendectomy, Cholecystectomy, Heart Catheterization, 

Orthopedic Surgery


Additional Past Surgical History / Comment(s): Rt hip replacement, L shoulder 

reconstructive surgery, bilateral lower extremities surgery for varicose veins-

laser,


Past Anesthesia/Blood Transfusion Reactions: No Reported Reaction


Smoking Status: Former smoker





- Past Family History


  ** Mother


Family Medical History: Cancer





  ** Father


Family Medical History: Cancer





  ** Sister(s)


Family Medical History: Coronary Artery Disease (CAD) (Patient had 4 sisters 

one of them  from CAD.)





  ** Son(s)


Family Medical History: No Reported History (Patient has a son with no major 

problems as well as one stepson)





  ** Daughter(s)


Family Medical History: No Reported History (Patient has one stepdaughter.)





Medications and Allergies


 Home Medications











 Medication  Instructions  Recorded  Confirmed  Type


 


Omeprazole [PriLOSEC] 40 mg PO QAM 16 History


 


ALPRAZolam [Xanax] 1 mg PO HS 18 History


 


Verapamil HCl [Verapamil ER] 120 mg PO QAM 18 History











 Allergies











Allergy/AdvReac Type Severity Reaction Status Date / Time


 


No Known Drug Allergies Allergy  Unknown Verified 18 16:39














Surgical - Exam


 Vital Signs











Temp Pulse Resp BP Pulse Ox


 


 98.3 F   75   18   140/75   97 


 


 18 09:43  18 09:43  18 09:43  18 09:43  18 09:43

















Physical exam:


General: Well-developed, well-nourished


HEENT: Normocephalic, sclerae nonicteric


Abdomen: Nontender, nondistended


Extremities: No edema


Neuro: Alert and oriented





Assessment and Plan


(1) Rectal bleeding


Narrative/Plan: 


Will proceed with colonoscopy with possible hemorrhoidal banding


Current Visit: Yes   Status: Acute   Code(s): K62.5 - HEMORRHAGE OF ANUS AND 

RECTUM   SNOMED Code(s): 18130753

## 2019-06-03 ENCOUNTER — HOSPITAL ENCOUNTER (OUTPATIENT)
Dept: HOSPITAL 47 - LABWHC1 | Age: 59
Discharge: HOME | End: 2019-06-03
Attending: SPECIALIST
Payer: COMMERCIAL

## 2019-06-03 DIAGNOSIS — M46.1: ICD-10-CM

## 2019-06-03 DIAGNOSIS — R91.8: ICD-10-CM

## 2019-06-03 DIAGNOSIS — M43.16: ICD-10-CM

## 2019-06-03 DIAGNOSIS — Z01.818: Primary | ICD-10-CM

## 2019-06-03 DIAGNOSIS — M54.5: ICD-10-CM

## 2019-06-03 DIAGNOSIS — M48.061: ICD-10-CM

## 2019-06-03 DIAGNOSIS — M99.83: ICD-10-CM

## 2019-06-03 DIAGNOSIS — G89.29: ICD-10-CM

## 2019-06-03 DIAGNOSIS — Z01.812: ICD-10-CM

## 2019-06-03 LAB
ANION GAP SERPL CALC-SCNC: 8 MMOL/L (ref 4–12)
APTT BLD: 25.8 SEC (ref 22–30)
BUN SERPL-SCNC: 13 MG/DL (ref 9–27)
CHLORIDE SERPL-SCNC: 107 MMOL/L (ref 96–109)
CO2 SERPL-SCNC: 25 MMOL/L (ref 21.6–31.8)
ERYTHROCYTE [DISTWIDTH] IN BLOOD BY AUTOMATED COUNT: 5.15 M/UL (ref 4.3–5.9)
ERYTHROCYTE [DISTWIDTH] IN BLOOD: 15.8 % (ref 11.5–15.5)
HCT VFR BLD AUTO: 43.4 % (ref 39–53)
HGB BLD-MCNC: 15 GM/DL (ref 13–17.5)
INR PPP: 0.9 (ref ?–1.2)
MCH RBC QN AUTO: 29.2 PG (ref 25–35)
MCHC RBC AUTO-ENTMCNC: 34.6 G/DL (ref 31–37)
MCV RBC AUTO: 84.3 FL (ref 80–100)
PH UR: 5 [PH] (ref 5–8)
PLATELET # BLD AUTO: 222 K/UL (ref 150–450)
POTASSIUM SERPL-SCNC: 4.1 MMOL/L (ref 3.5–5.5)
PT BLD: 10.1 SEC (ref 9–12)
SODIUM SERPL-SCNC: 140 MMOL/L (ref 135–145)
SP GR UR: 1.02 (ref 1–1.03)
UROBILINOGEN UR QL STRIP: <2 MG/DL (ref ?–2)
WBC # BLD AUTO: 7.5 K/UL (ref 3.8–10.6)

## 2019-06-03 PROCEDURE — 85610 PROTHROMBIN TIME: CPT

## 2019-06-03 PROCEDURE — 86900 BLOOD TYPING SEROLOGIC ABO: CPT

## 2019-06-03 PROCEDURE — 86901 BLOOD TYPING SEROLOGIC RH(D): CPT

## 2019-06-03 PROCEDURE — 84520 ASSAY OF UREA NITROGEN: CPT

## 2019-06-03 PROCEDURE — 86850 RBC ANTIBODY SCREEN: CPT

## 2019-06-03 PROCEDURE — 93005 ELECTROCARDIOGRAM TRACING: CPT

## 2019-06-03 PROCEDURE — 82565 ASSAY OF CREATININE: CPT

## 2019-06-03 PROCEDURE — 81003 URINALYSIS AUTO W/O SCOPE: CPT

## 2019-06-03 PROCEDURE — 85730 THROMBOPLASTIN TIME PARTIAL: CPT

## 2019-06-03 PROCEDURE — 71046 X-RAY EXAM CHEST 2 VIEWS: CPT

## 2019-06-03 PROCEDURE — 80051 ELECTROLYTE PANEL: CPT

## 2019-06-03 PROCEDURE — 87070 CULTURE OTHR SPECIMN AEROBIC: CPT

## 2019-06-03 PROCEDURE — 85027 COMPLETE CBC AUTOMATED: CPT

## 2019-06-03 PROCEDURE — 36415 COLL VENOUS BLD VENIPUNCTURE: CPT

## 2019-06-03 NOTE — XR
EXAMINATION TYPE: XR chest 2V

 

DATE OF EXAM: 6/3/2019

 

COMPARISON: NONE

 

HISTORY: Presurgical exam.

 

TECHNIQUE:  Frontal and lateral views of the chest are obtained.

 

FINDINGS:  Low lung volumes are seen throughout. There is no focal air space opacity, pleural effusio
n, or pneumothorax seen.  The cardiac silhouette size is within normal limits.   The osseous structur
es are intact. Mild multilevel degenerative changes of the thoracic spine are seen.

 

IMPRESSION:  Hypoventilatory lungs without acute cardiopulmonary process.

## 2019-09-27 ENCOUNTER — HOSPITAL ENCOUNTER (OUTPATIENT)
Dept: HOSPITAL 47 - ORWHC2ENDO | Age: 59
Discharge: HOME | End: 2019-09-27
Attending: INTERNAL MEDICINE
Payer: COMMERCIAL

## 2019-09-27 VITALS — HEART RATE: 69 BPM | DIASTOLIC BLOOD PRESSURE: 88 MMHG | SYSTOLIC BLOOD PRESSURE: 136 MMHG

## 2019-09-27 VITALS — RESPIRATION RATE: 16 BRPM | TEMPERATURE: 97.5 F

## 2019-09-27 VITALS — BODY MASS INDEX: 29.8 KG/M2

## 2019-09-27 DIAGNOSIS — Z90.49: ICD-10-CM

## 2019-09-27 DIAGNOSIS — I25.2: ICD-10-CM

## 2019-09-27 DIAGNOSIS — Z87.891: ICD-10-CM

## 2019-09-27 DIAGNOSIS — M19.90: ICD-10-CM

## 2019-09-27 DIAGNOSIS — Z87.81: ICD-10-CM

## 2019-09-27 DIAGNOSIS — Z79.899: ICD-10-CM

## 2019-09-27 DIAGNOSIS — K21.0: ICD-10-CM

## 2019-09-27 DIAGNOSIS — K29.50: Primary | ICD-10-CM

## 2019-09-27 DIAGNOSIS — J44.9: ICD-10-CM

## 2019-09-27 DIAGNOSIS — K31.9: ICD-10-CM

## 2019-09-27 PROCEDURE — 88305 TISSUE EXAM BY PATHOLOGIST: CPT

## 2019-09-27 PROCEDURE — 43239 EGD BIOPSY SINGLE/MULTIPLE: CPT

## 2019-09-27 NOTE — P.PCN
Date of Procedure: 09/27/19


Procedure(s) Performed: 


BRIEF HISTORY: Patient is a 58-year-old, pleasant, white male, scheduled for an 

upper endoscopy as a part of evaluation of long-standing history of GERD, 

atypical chest pain for the last 2 years duration.  He has been on Prilosec 20 

mg daily and is much better however continues to have intermittent spasms in the

mid sternal area associated with some dysphagia.. 





PROCEDURE PERFORMED: Esophagogastroduodenoscopy with biopsy.





PREOPERATIVE DIAGNOSIS: Long-standing history of GERD/atypical chest pain. 





IV sedation per anesthesia. 





PROCEDURE: After informed consent was obtained, the patient  was brought into 

the endoscopy unit. IV sedation was administered by Anesthesia under continuous 

monitoring. Initially the Olympus GIF-140 video endoscope was inserted into the 

mouth. Esophagus intubated without any difficulty. It was gradually advanced 

into the stomach and duodenum and carefully examined. The bulb and the second 

part of the duodenum appeared normal. The scope at this time was withdrawn to 

the stomach, adequately insufflated with air, and upon careful examination, mu

cosa of the antrum had mild gastritis and biopsies were done from this area.  

The, body, cardia and the fundus appeared normal. The scope was then withdrawn 

into the esophagus. The GE junction was located at 39 cm from the incisors. The 

esophagus appeared normal. There were no erosions or ulcerations seen , biopsies

were done from the distal esophagus and the patient tolerated the procedure 

well. 





IMPRESSION: 


1.  Mild antral gastritis.


2.  Normal-appearing esophagus with no evidence of esophagitis or esophageal 

stricture.





RECOMMENDATIONS: The findings of this examination were discussed with the 

patient well as his family.  He was advised to follow with the biopsy results.  

He will continue with Prevacid 20 mg daily and continue to follow antireflux 

measures.

## 2020-12-30 ENCOUNTER — HOSPITAL ENCOUNTER (OUTPATIENT)
Dept: HOSPITAL 47 - RADCTMAIN | Age: 60
Discharge: HOME | End: 2020-12-30
Attending: INTERNAL MEDICINE
Payer: COMMERCIAL

## 2020-12-30 DIAGNOSIS — Z88.6: ICD-10-CM

## 2020-12-30 DIAGNOSIS — R06.02: Primary | ICD-10-CM

## 2020-12-30 PROCEDURE — 71275 CT ANGIOGRAPHY CHEST: CPT

## 2020-12-30 NOTE — CT
EXAMINATION TYPE: CT angio chest

 

DATE OF EXAM: 12/30/2020

 

COMPARISON:

 

HISTORY: SOB, hx of COPD

 

CT DLP: 448.1 mGycm

Automated exposure control for dose reduction was used.

 

CONTRAST: 

CTA scan of the thorax is performed with IV Contrast, patient injected with 60cc mL of Isovue 370, pu
lmonary embolism protocol.  MIP images are created and reviewed.  3D reconstructed images are created
 on an independent workstation and reviewed.

 

FINDINGS:

 

LUNGS: The lungs are grossly clear, there is no concerning parenchymal mass or nodule identified.   T
here is no pleural effusion or pneumothorax seen. Mild prominence of the pulmonary artery, correlate 
for possible pulmonary artery hypertension The tracheobronchial tree is patent.

 

AORTA:  No additional significant abnormality is seen.

 

MEDIASTINUM: There is satisfactory enhancement of the pulmonary artery and its branches, there is no 
CT evidence for pulmonary embolism.  There are no greater than 1 cm hilar or mediastinal lymph nodes.
   No pericardial effusion is seen. 

 

 

OTHER:  Anterior flowing osteophytes in the thoracic spine with preservation of disc height may be in
dicative of diffuse idiopathic skeletal hyperostosis. Patient is post cholecystectomy.

 

 

 

IMPRESSION: 

NO EVIDENT PULMONARY EMBOLISM. CORRELATE FOR POSSIBLE PULMONARY ARTERY HYPERTENSION.

## 2021-01-13 ENCOUNTER — HOSPITAL ENCOUNTER (OUTPATIENT)
Dept: HOSPITAL 47 - RADECHMAIN | Age: 61
Discharge: HOME | End: 2021-01-13
Attending: INTERNAL MEDICINE
Payer: COMMERCIAL

## 2021-01-13 DIAGNOSIS — I08.1: Primary | ICD-10-CM

## 2021-01-13 PROCEDURE — 93306 TTE W/DOPPLER COMPLETE: CPT

## 2021-01-13 NOTE — ECHOF
Referral Reason:R06.09 Dyspnea



MEASUREMENTS

--------

HEIGHT: 182.9 cm

WEIGHT: 97.5 kg

BP: 

RVIDd:   3.0 cm     (< 3.3)

IVSd:   1.0 cm     (0.6 - 1.1)

LVIDd:   4.9 cm     (3.9 - 5.3)

LVPWd:   1.0 cm     (0.6 - 1.1)

IVSs:   1.4 cm

LVIDs:   3.6 cm

LVPWs:   1.7 cm

LAESV Index (A-L):   24.85 ml/m

Ao Diam:   3.1 cm     (2.0 - 3.7)

AV Cusp:   1.9 cm     (1.5 - 2.6)

MV EXCURSION:   18.395 mm     (> 18.000)

MV EF SLOPE:   69 mm/s     (70 - 150)

EPSS:   0.6 cm

MV E Duane:   0.54 m/s

MV DecT:   171 ms

MV A Duane:   0.58 m/s

MV E/A Ratio:   0.94 

RAP:   5.00 mmHg

RVSP:   13.09 mmHg







FINDINGS

--------

Sinus rhythm.

This was a technically good study.

LV size, wall thickness and systolic function are normal, with an EF greater than 55%.   The left eddie
tricular size is normal.

The right ventricle is normal in size.

Normal LA  size by volume 22+/-6 ml/m2.

The right atrial size is normal.

The aortic valve is trileaflet, and appears structurally normal. No aortic stenosis or regurgitation.


Mild mitral regurgitation is present.

Mild tricuspid regurgitation present.   Right ventricular systolic pressure is normal at < 35 mmHg.

There is no pulmonic regurgitation present.

The aortic root size is normal.

Echo free space represents a pericardial fat pad.



CONCLUSIONS

--------

1. LV size, wall thickness and systolic function are normal, with an EF greater than 55%.

2. The left ventricular size is normal.

3. The right ventricle is normal in size.

4. Normal LA size by volume 22+/-6 ml/m2.

5. The right atrial size is normal.

6. Mild mitral regurgitation is present.

7. Mild tricuspid regurgitation present.

8. The aortic root size is normal.

9. Echo free space represents a pericardial fat pad.





SONOGRAPHER: Cara Granado RDCS

## 2021-02-12 ENCOUNTER — HOSPITAL ENCOUNTER (OUTPATIENT)
Dept: HOSPITAL 47 - RADCTMAIN | Age: 61
Discharge: HOME | End: 2021-02-12
Attending: OTOLARYNGOLOGY
Payer: COMMERCIAL

## 2021-02-12 DIAGNOSIS — K13.70: Primary | ICD-10-CM

## 2021-02-12 DIAGNOSIS — R06.02: ICD-10-CM

## 2021-02-12 PROCEDURE — 70491 CT SOFT TISSUE NECK W/DYE: CPT

## 2021-02-12 NOTE — CT
EXAMINATION TYPE: CT soft tissue neck w con

 

DATE OF EXAM: 2/12/2021

 

COMPARISON: None

 

HISTORY: Hard to breath, pain in neck muscles, hoarseness

 

CT DLP: 618.3 mGycm

 

CONTRAST: Patient injected with 100 mL of Isovue 300.

 

TECHNIQUE: Axial images at 3 mm thick sections.  Reconstructed images in the coronal plane and sagitt
al plane are reviewed.

 

FINDINGS: Limited CT sections are obtained the lung apices.  The lung apices appear clear. 

 

CT neck: The torus tubarius and fossa of Rosenmuller are normal.   spaces are normal.  Para
nasal sinuses and mastoid air cells are clear. 

 

Parotid glands appear normal and symmetrical.  Submandibular glands, are normal.  Parapharyngeal spac
es are normal. Calcification is within the right tonsillar pillar. A small calcification is also with
in the left tonsillar pillar.  No suspicious adenopathy is evident. 

 

The hypopharynx appears within normal limits. There appears to be some minimal debris within the vall
ecula. Region of the hard palate as visualized is unremarkable.

Vocal cord level appear symmetrical. Subglottic airway is normal.

There is some heterogeneity within the thyroid.  

 

Osseous structures are normal. Disc changes and mild loss of disc height is present through the cervi
ramo spine.

 

IMPRESSIONS:

1. No suspicious acute changes.

## 2023-03-17 ENCOUNTER — HOSPITAL ENCOUNTER (OUTPATIENT)
Dept: HOSPITAL 47 - RADMRIMAIN | Age: 63
Discharge: HOME | End: 2023-03-17
Attending: PHYSICAL MEDICINE & REHABILITATION
Payer: COMMERCIAL

## 2023-03-17 DIAGNOSIS — M25.462: ICD-10-CM

## 2023-03-17 DIAGNOSIS — S80.02XA: Primary | ICD-10-CM

## 2023-03-17 DIAGNOSIS — M23.222: ICD-10-CM

## 2023-03-18 NOTE — MR
EXAMINATION TYPE: MR knee LT wo con

 

DATE OF EXAM: 3/17/2023

 

COMPARISON: None

 

HISTORY: PAIN AND SWELLING LT KNEE

 

Multiplanar multiecho imaging of the left knee with no contrast.

 

The collateral ligaments are intact. The anterior and posterior cruciate ligaments are intact. There 
is small knee joint effusion. There is vertical and horizontal tear of the posterior horn of the medi
al meniscus extending to the inferior surface. There is small area of bone edema in the posterior asp
ect of the medial tibial condyle. The lateral meniscus is intact. The anterior horn medial meniscus i
s intact. No fracture line seen. Distal femur is intact. No significant joint space narrowing.

 

IMPRESSION:

Small knee joint effusion. Complex tear of the posterior horn medial meniscus. Small bone bruise in t
he posterior aspect of the medial tibial condyle. No fracture.

## 2025-04-22 ENCOUNTER — HOSPITAL ENCOUNTER (OUTPATIENT)
Dept: HOSPITAL 47 - ORWHC2ENDO | Age: 65
Discharge: HOME | End: 2025-04-22
Attending: SURGERY
Payer: COMMERCIAL

## 2025-04-22 VITALS — BODY MASS INDEX: 31.1 KG/M2

## 2025-04-22 VITALS — TEMPERATURE: 97 F | RESPIRATION RATE: 18 BRPM

## 2025-04-22 VITALS — HEART RATE: 75 BPM | SYSTOLIC BLOOD PRESSURE: 155 MMHG | DIASTOLIC BLOOD PRESSURE: 77 MMHG

## 2025-04-22 DIAGNOSIS — K57.30: ICD-10-CM

## 2025-04-22 DIAGNOSIS — J45.909: ICD-10-CM

## 2025-04-22 DIAGNOSIS — I25.2: ICD-10-CM

## 2025-04-22 DIAGNOSIS — Z79.899: ICD-10-CM

## 2025-04-22 DIAGNOSIS — K21.9: ICD-10-CM

## 2025-04-22 DIAGNOSIS — Z79.51: ICD-10-CM

## 2025-04-22 DIAGNOSIS — F12.90: ICD-10-CM

## 2025-04-22 DIAGNOSIS — K64.4: ICD-10-CM

## 2025-04-22 DIAGNOSIS — Z12.11: Primary | ICD-10-CM

## 2025-04-22 DIAGNOSIS — F17.210: ICD-10-CM

## 2025-04-22 DIAGNOSIS — M19.90: ICD-10-CM

## 2025-04-22 DIAGNOSIS — D12.3: ICD-10-CM

## 2025-04-22 PROCEDURE — 88305 TISSUE EXAM BY PATHOLOGIST: CPT

## 2025-04-22 PROCEDURE — 45385 COLONOSCOPY W/LESION REMOVAL: CPT

## 2025-04-22 PROCEDURE — 73502 X-RAY EXAM HIP UNI 2-3 VIEWS: CPT

## 2025-04-22 PROCEDURE — 46600 DIAGNOSTIC ANOSCOPY SPX: CPT

## 2025-04-22 RX ADMIN — POTASSIUM CHLORIDE SCH MLS/HR: 14.9 INJECTION, SOLUTION INTRAVENOUS at 11:06

## 2025-04-22 RX ADMIN — POTASSIUM CHLORIDE ONE MLS: 14.9 INJECTION, SOLUTION INTRAVENOUS at 11:04

## 2025-04-22 RX ADMIN — KETOROLAC TROMETHAMINE STA MG: 15 INJECTION, SOLUTION INTRAMUSCULAR; INTRAVENOUS at 12:31

## 2025-05-09 ENCOUNTER — HOSPITAL ENCOUNTER (OUTPATIENT)
Dept: HOSPITAL 47 - OR | Age: 65
End: 2025-05-09
Attending: SURGERY
Payer: MEDICARE

## 2025-05-09 VITALS — RESPIRATION RATE: 16 BRPM

## 2025-05-09 VITALS — TEMPERATURE: 97.6 F

## 2025-05-09 VITALS — SYSTOLIC BLOOD PRESSURE: 144 MMHG | HEART RATE: 63 BPM | DIASTOLIC BLOOD PRESSURE: 76 MMHG

## 2025-05-09 DIAGNOSIS — I25.2: ICD-10-CM

## 2025-05-09 DIAGNOSIS — Z79.51: ICD-10-CM

## 2025-05-09 DIAGNOSIS — Z79.899: ICD-10-CM

## 2025-05-09 DIAGNOSIS — Z86.0100: ICD-10-CM

## 2025-05-09 DIAGNOSIS — K64.4: Primary | ICD-10-CM

## 2025-05-09 DIAGNOSIS — Z96.641: ICD-10-CM

## 2025-05-09 DIAGNOSIS — J45.909: ICD-10-CM

## 2025-05-09 DIAGNOSIS — G35: ICD-10-CM

## 2025-05-09 DIAGNOSIS — Z87.891: ICD-10-CM

## 2025-05-09 DIAGNOSIS — M19.90: ICD-10-CM

## 2025-05-09 DIAGNOSIS — Z82.49: ICD-10-CM

## 2025-05-09 DIAGNOSIS — Z90.49: ICD-10-CM

## 2025-05-09 PROCEDURE — 88304 TISSUE EXAM BY PATHOLOGIST: CPT

## 2025-05-09 PROCEDURE — 46220 EXCISE ANAL EXT TAG/PAPILLA: CPT

## 2025-05-09 RX ADMIN — ONDANSETRON ONE MG: 2 INJECTION INTRAMUSCULAR; INTRAVENOUS at 11:51

## 2025-05-09 RX ADMIN — ACETAMINOPHEN PRN MG: 500 TABLET ORAL at 11:51

## 2025-05-09 RX ADMIN — HEPARIN SODIUM PRN UNIT: 5000 INJECTION, SOLUTION INTRAVENOUS; SUBCUTANEOUS at 11:51

## 2025-05-09 RX ADMIN — POTASSIUM CHLORIDE ONE MLS: 14.9 INJECTION, SOLUTION INTRAVENOUS at 11:48

## 2025-05-09 RX ADMIN — DEXAMETHASONE SODIUM PHOSPHATE ONE MG: 4 INJECTION, SOLUTION INTRAMUSCULAR; INTRAVENOUS at 11:51

## 2025-05-09 RX ADMIN — BUPIVACAINE HYDROCHLORIDE ONE ML: 2.5 INJECTION, SOLUTION EPIDURAL; INFILTRATION; INTRACAUDAL; PERINEURAL at 12:58

## 2025-05-09 RX ADMIN — KETOROLAC TROMETHAMINE STA MG: 15 INJECTION, SOLUTION INTRAMUSCULAR; INTRAVENOUS at 14:20

## 2025-05-09 RX ADMIN — POTASSIUM CHLORIDE SCH MLS/HR: 14.9 INJECTION, SOLUTION INTRAVENOUS at 11:49
